# Patient Record
Sex: FEMALE | Race: BLACK OR AFRICAN AMERICAN
[De-identification: names, ages, dates, MRNs, and addresses within clinical notes are randomized per-mention and may not be internally consistent; named-entity substitution may affect disease eponyms.]

---

## 2018-02-20 ENCOUNTER — HOSPITAL ENCOUNTER (EMERGENCY)
Dept: HOSPITAL 72 - EMR | Age: 43
Discharge: HOME | End: 2018-02-20
Payer: COMMERCIAL

## 2018-02-20 VITALS — DIASTOLIC BLOOD PRESSURE: 90 MMHG | SYSTOLIC BLOOD PRESSURE: 136 MMHG

## 2018-02-20 VITALS — SYSTOLIC BLOOD PRESSURE: 136 MMHG | DIASTOLIC BLOOD PRESSURE: 90 MMHG

## 2018-02-20 VITALS — BODY MASS INDEX: 36.46 KG/M2 | HEIGHT: 57 IN | WEIGHT: 169 LBS

## 2018-02-20 DIAGNOSIS — L50.9: ICD-10-CM
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
## 2018-02-20 DIAGNOSIS — L29.9: ICD-10-CM

## 2018-02-20 DIAGNOSIS — J45.909: ICD-10-CM

## 2018-02-20 DIAGNOSIS — T78.40XA: Primary | ICD-10-CM

## 2018-02-20 PROCEDURE — 99283 EMERGENCY DEPT VISIT LOW MDM: CPT

## 2018-04-01 ENCOUNTER — HOSPITAL ENCOUNTER (EMERGENCY)
Dept: HOSPITAL 72 - EMR | Age: 43
Discharge: HOME | End: 2018-04-01
Payer: MEDICAID

## 2018-04-01 VITALS — HEIGHT: 57 IN | BODY MASS INDEX: 36.89 KG/M2 | WEIGHT: 171 LBS

## 2018-04-01 VITALS — SYSTOLIC BLOOD PRESSURE: 156 MMHG | DIASTOLIC BLOOD PRESSURE: 98 MMHG

## 2018-04-01 DIAGNOSIS — T78.40XA: ICD-10-CM
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
## 2018-04-01 DIAGNOSIS — J06.9: Primary | ICD-10-CM

## 2018-04-01 DIAGNOSIS — J45.909: ICD-10-CM

## 2018-04-01 PROCEDURE — 99284 EMERGENCY DEPT VISIT MOD MDM: CPT

## 2018-04-01 NOTE — EMERGENCY ROOM REPORT
History of Present Illness


General


Chief Complaint:  Earache


Source:  Patient





Present Illness


HPI


42-year-old female presents ED for evaluation.  Patient complaining of 

bilateral ear fullness and popping sensation times one week.  Pain is dull, 3 

out of 10, nonradiating.  Also noting congestion and runny nose.  Denies fevers 

or chills.  Denies sore throat.  Denies sick contacts or recent travel.  

Patient also noting some right upper lip swelling.  States she was seen here 

last month for similar presentation.  Was prescribed medications which helped.  

Denies any known food or drug allergies.  Denies tongue swelling or throat 

swelling.  Denies rash.  No other aggravating relieving factors.  Denies any 

other associated symptoms


Allergies:  


Coded Allergies:  


     No Known Allergies (Unverified , 4/27/13)





Patient History


Past Medical History:  asthma


Past Surgical History:  none


Pertinent Family History:  none


Social History:  Denies: smoking, alcohol use, drug use


Last Menstrual Period:  03/29/18


Pregnant Now:  No


Immunizations:  UTD


Reviewed Nursing Documentation:  PMH: Agreed; PSxH: Agreed





Nursing Documentation-PMH


Hx Asthma:  Yes





Review of Systems


All Other Systems:  negative except mentioned in HPI





Physical Exam





Vital Signs








  Date Time  Temp Pulse Resp B/P (MAP) Pulse Ox O2 Delivery O2 Flow Rate FiO2


 


4/1/18 10:02 98.5 92 18 156/98 97 Room Air  





 98.4       








Sp02 EP Interpretation:  reviewed, normal


General Appearance:  no apparent distress, alert, GCS 15, non-toxic


Head:  normocephalic, atraumatic


Eyes:  bilateral eye normal inspection, bilateral eye PERRL


ENT:  hearing grossly normal, normal pharynx, no angioedema, normal voice, 

other - minimal R upper lip swelling


Neck:  full range of motion, supple/symm/no masses


Respiratory:  chest non-tender, lungs clear, normal breath sounds, speaking 

full sentences


Cardiovascular #1:  regular rate, rhythm, no edema


Cardiovascular #2:  2+ carotid (R), 2+ carotid (L), 2+ radial (R), 2+ radial (L)

, 2+ dorsalis pedis (R), 2+ dorsalis pedis (L)


Gastrointestinal:  normal bowel sounds, non tender, soft, non-distended, no 

guarding, no rebound


Rectal:  deferred


Genitourinary:  normal inspection, no CVA tenderness


Musculoskeletal:  back normal, gait/station normal, normal range of motion, non-

tender


Neurologic:  alert, oriented x3, responsive, motor strength/tone normal, 

sensory intact, speech normal


Psychiatric:  judgement/insight normal, memory normal, mood/affect normal, no 

suicidal/homicidal ideation


Reflexes:  3+ bicep (R), 3+ bicep (L), 3+ tricep (R), 3+ tricep (L), 3+ knee (R)

, 3+ knee (L)


Skin:  normal color, no rash, warm/dry, well hydrated


Lymphatic:  no adenopathy





Medical Decision Making


Diagnostic Impression:  


 Primary Impression:  


 Upper respiratory infection


 Qualified Codes:  J06.9 - Acute upper respiratory infection, unspecified


 Additional Impression:  


 Allergic reaction


 Qualified Codes:  T78.40XD - Allergy, unspecified, subsequent encounter


ER Course


Hospital Course 


42-year-old female presents ED complaining of bilateral ear fullness, runny nose

, congestion.  Right upper lip swelling





Differential diagnoses include: URI, pharyngitis, otitis media, asthma 





Clinical course


Patient placed on stretcher.  After initial history, physical exam reveals a 

female in no acute distress.  Bilateral TM unremarkable.  No pharyngeal 

erythema.  No tonsillar exudates.  No lymphadenopathy.  lungs clear. abdomen 

soft. 





minimal R upper lip swelling. no stridor. no tongue swelling. no urticaria





Discussed findings with patient.  Patient is likely congested from upper 

respiratory infection.  Symptomatic treatment.  No antibiotics required.  

Patient appears nontoxic, afebrile.





I saw the patient in February for similar lip swelling which was worse at the 

time.  Patient was prescribed prednisone, Benadryl, Zantac.  Patient states the 

medications were helping.  I will provide her refills of these medications.  

Unclear what is triggering her allergic reaction but recommend follow-up with 

allergist





Diagnosis - URI, allergic reaction





Stable and discharged home with Rx Zantac, Prednisone.  Instructed to followup 

with PMD.  Return to ED if symptoms recur or worsen





Last Vital Signs








  Date Time  Temp Pulse Resp B/P (MAP) Pulse Ox O2 Delivery O2 Flow Rate FiO2


 


4/1/18 10:49 98.4 84 18 156/98 98 Room Air  





 98.4       








Status:  improved


Disposition:  HOME, SELF-CARE


Condition:  Stable


Scripts


Prednisone* (PREDNISONE*) 20 Mg Tablet


40 MG ORAL DAILY, #10 TAB


   Prov: Ambrosio Spencer MD         4/1/18 


Ranitidine Hcl* (ZANTAC*) 150 Mg Tablet


150 MG ORAL TWICE A DAY, #30 TAB


   Prov: Ambrosio Spencer MD         4/1/18


Patient Instructions:  Upper Respiratory Infection, Adult, Easy-to-Read





Additional Instructions:  


take multisymptom medication like tylenol cold and flu, or dayquil/nyquil











Ambrosio Spencer MD Apr 1, 2018 11:53

## 2019-02-27 ENCOUNTER — HOSPITAL ENCOUNTER (EMERGENCY)
Dept: HOSPITAL 72 - EMR | Age: 44
Discharge: HOME | End: 2019-02-27
Payer: MEDICAID

## 2019-02-27 VITALS — HEIGHT: 57 IN | WEIGHT: 170 LBS | BODY MASS INDEX: 36.68 KG/M2

## 2019-02-27 VITALS — SYSTOLIC BLOOD PRESSURE: 116 MMHG | DIASTOLIC BLOOD PRESSURE: 74 MMHG

## 2019-02-27 VITALS — DIASTOLIC BLOOD PRESSURE: 74 MMHG | SYSTOLIC BLOOD PRESSURE: 116 MMHG

## 2019-02-27 DIAGNOSIS — H66.92: Primary | ICD-10-CM

## 2019-02-27 DIAGNOSIS — J45.909: ICD-10-CM

## 2019-02-27 PROCEDURE — 99282 EMERGENCY DEPT VISIT SF MDM: CPT

## 2019-02-27 NOTE — EMERGENCY ROOM REPORT
History of Present Illness


General


Chief Complaint:  Earache


Source:  Patient





Present Illness


HPI


43-year-old female presents ED for evaluation.  Complaining of left ear pain 1 

day.  Pain is throbbing, 8 out of 10, nonradiating.  Denies sore throat or 

cough.  Denies fevers or chills.  Denies sick contacts or recent travel.  No 

other aggravating relieving factors.  Denies any other associated symptoms


Allergies:  


Coded Allergies:  


     No Known Allergies (Unverified , 4/27/13)





Patient History


Past Medical History:  asthma


Past Surgical History:  none


Pertinent Family History:  none


Social History:  Denies: smoking, alcohol use, drug use


Last Menstrual Period:  2/1/19


Pregnant Now:  No


Immunizations:  UTD


Reviewed Nursing Documentation:  PMH: Agreed; PSxH: Agreed





Nursing Documentation-PMH


Past Medical History:  No History, Except For


Hx Asthma:  Yes





Review of Systems


All Other Systems:  negative except mentioned in HPI





Physical Exam





Vital Signs








  Date Time  Temp Pulse Resp B/P (MAP) Pulse Ox O2 Delivery O2 Flow Rate FiO2


 


2/27/19 09:46 99.0 90 20 116/74 98 Room Air  








Sp02 EP Interpretation:  reviewed, normal


General Appearance:  no apparent distress, alert, GCS 15, non-toxic


Head:  normocephalic


Eyes:  bilateral eye normal inspection, bilateral eye PERRL


ENT:  hearing grossly normal, normal pharynx, no angioedema, normal voice, 

other - L TM poor light reflex


Neck:  full range of motion, supple, no meningismus, supple/symm/no masses


Respiratory:  normal inspection


Cardiovascular #1:  normal inspection


Gastrointestinal:  normal inspection


Rectal:  deferred


Genitourinary:  no CVA tenderness


Musculoskeletal:  normal inspection


Neurologic:  alert, oriented x3, responsive, motor strength/tone normal, 

sensory intact, speech normal


Psychiatric:  normal inspection


Skin:  normal inspection


Lymphatic:  adenopathy





Medical Decision Making


Diagnostic Impression:  


 Primary Impression:  


 Otitis media


 Qualified Codes:  H66.92 - Otitis media, unspecified, left ear


ER Course


Hospital Course 


43-year-old F presents to ED with pain L ear.  no fever.  





Differential diagnoses include: TM perforation, otitis externa, otitis media





Clinical course


Patient placed on stretcher.  After initial history, physical exam reveals a 

female in no acute distress.  L TM poor light reflex.  Some left-sided 

submandibular lymphadenopathy.  Remainder of exam unremarkable





Discussed findings with patient.  Consideration for otitis media.  We'll 

discharge and antibiotics.  Safe for discharge and close outpatient follow-up.  

We'll provide referrals





Diagnosis - otitis media 





Stable and discharged to home with Rx amoxicillin, motrin.  Followup with PMD.  

Return to ED if symptoms recur or worsen





Last Vital Signs








  Date Time  Temp Pulse Resp B/P (MAP) Pulse Ox O2 Delivery O2 Flow Rate FiO2


 


2/27/19 11:02 99.0 78 20 116/74 98 Room Air  








Status:  improved


Disposition:  HOME, SELF-CARE


Condition:  Stable


Scripts


Ibuprofen* (MOTRIN*) 600 Mg Tablet


600 MG ORAL Q8H PRN for For Pain, #30 TAB 0 Refills


   Prov: Ambrosio Spencer MD         2/27/19 


Amoxicillin* (AMOXIL*) 500 Mg Capsule


500 MG ORAL THREE TIMES A DAY for 10 Days, CAP


   Prov: Ambrosio Spencer MD         2/27/19


Referrals:  


[facility],REFERRING (PCP)











[facility]


Patient Instructions:  Otitis Media, Adult











Ambrosio Spencer MD Feb 27, 2019 13:30